# Patient Record
Sex: MALE | Race: WHITE | NOT HISPANIC OR LATINO | Employment: FULL TIME | URBAN - METROPOLITAN AREA
[De-identification: names, ages, dates, MRNs, and addresses within clinical notes are randomized per-mention and may not be internally consistent; named-entity substitution may affect disease eponyms.]

---

## 2018-05-12 ENCOUNTER — OFFICE VISIT (OUTPATIENT)
Dept: URGENT CARE | Facility: CLINIC | Age: 53
End: 2018-05-12
Payer: COMMERCIAL

## 2018-05-12 VITALS
DIASTOLIC BLOOD PRESSURE: 85 MMHG | HEIGHT: 68 IN | RESPIRATION RATE: 16 BRPM | OXYGEN SATURATION: 98 % | TEMPERATURE: 98 F | SYSTOLIC BLOOD PRESSURE: 128 MMHG | BODY MASS INDEX: 23.79 KG/M2 | WEIGHT: 157 LBS | HEART RATE: 72 BPM

## 2018-05-12 DIAGNOSIS — A09 TRAVELER'S DIARRHEA: Primary | ICD-10-CM

## 2018-05-12 PROCEDURE — 3008F BODY MASS INDEX DOCD: CPT | Mod: CPTII,S$GLB,, | Performed by: NURSE PRACTITIONER

## 2018-05-12 PROCEDURE — 99203 OFFICE O/P NEW LOW 30 MIN: CPT | Mod: S$GLB,,, | Performed by: NURSE PRACTITIONER

## 2018-05-12 RX ORDER — SERTRALINE HYDROCHLORIDE 50 MG/1
TABLET, FILM COATED ORAL
COMMUNITY

## 2018-05-12 RX ORDER — LOSARTAN POTASSIUM 100 MG/1
TABLET ORAL
COMMUNITY

## 2018-05-12 RX ORDER — AMLODIPINE BESYLATE 10 MG/1
TABLET ORAL
COMMUNITY

## 2018-05-12 RX ORDER — ATORVASTATIN CALCIUM 40 MG/1
TABLET, FILM COATED ORAL
COMMUNITY

## 2018-05-12 RX ORDER — CIPROFLOXACIN 500 MG/1
500 TABLET ORAL 2 TIMES DAILY
Qty: 6 TABLET | Refills: 0 | Status: SHIPPED | OUTPATIENT
Start: 2018-05-12 | End: 2018-05-17

## 2018-05-12 NOTE — PATIENT INSTRUCTIONS
Traveler's Diarrhea (Adult)    Traveler's diarrhea is an infection in the intestinal tract caused by bacteria called E coli. This bacteria is commonly found in water supplies of developing countries. The local people of those countries are used to E coli in the water and don't get sick. Tourists who drink contaminated water or eat foods that were washed or prepared with this water may become very ill.  The illness begins 1 to 3 days after exposure and lasts up to 5 days. Symptoms include fever, vomiting, stomach cramping and watery diarrhea. There may also be blood or mucus in the stool. Mild cases will get better without treatment. Antibiotics are used for more severe cases.  Home care  · If you were prescribed antibiotics,  take them until they are finished.  · You may use acetaminophen or ibuprofen to control fever, unless another medicine was prescribed. If you have chronic liver or kidney disease or have ever had a stomach ulcer or gastrointestinal  bleeding, talk with the doctor before using these medicines. Aspirin should never be used in anyone under 18 years of age who is ill with a fever. It may cause severe illness or death.  · Do not take over-the-counter anti-diarrheal medicines, unless advised by the doctor.  Once vomiting stops, follow these guidelines:  During the first 12 to 24 hours, follow the diet below:  · Fruit juices. Apple, grape and cranberry juice; clear fruit drinks, electrolyte replacement and sports drinks.  · Beverages. Soft drinks without caffeine; mineral water (plain or flavored); decaffeinated tea and coffee  · Soups. Clear broth, consommé and bouillon.  · Desserts. Plain gelatin, popsicles and fruit juice bars; As you feel better, you may add 6 to 8 oz of yogurt per day.  During the next 24 hours, you may add the following to the above:  · Hot cereal, plain toast, bread, rolls, crackers  · Plain noodles, rice, mashed potatoes, chicken noodle or rice soup  · Unsweetened canned  fruit (avoid pineapple), bananas  · Limit fat intake to less than 15 grams per day by avoiding margarine, butter, oils, mayonnaise, sauces, gravies, fried foods, peanut butter, meat, poultry and fish.  · Limit fiber; avoid raw or cooked vegetables, fresh fruits (except bananas) and bran cereals.  · Limit caffeine and chocolate. No spices or seasonings except salt.  During the next 24 hours you can gradually resume a normal diet as the symptoms lessen.  Follow-up care  Follow up with your healthcare provider, or as advised. Call if you are not improving within 24 hours or if the diarrhea lasts more than 1 week on antibiotics. If a stool (diarrhea) sample was taken, you may call in 2 days (or as directed) for the results.  When to seek medical advice  Call your healthcare provider if any of these occur:  · Severe constant pain in the lower part of the abdomen, on the right side  · Blood in diarrhea or vomit, dark coffee ground appearing vomit, or dark tarry stools  · continued vomiting (unable to keep liquids down)  · Frequent diarrhea (more than 5 times a day); blood (red or black) or mucus in diarrhea  · Reduced oral intake  · Reduced urine output or extreme thirst  · Weakness, dizziness, fainting  · Drowsiness, confusion, stiff neck, or seizure  · Fever (1 degree above your normal temperature) lasting 24 to 48 hours, or whatever your healthcare provider told you to report based on your condition  Date Last Reviewed: 11/16/2015  © 3262-1154 [employer]. [address] [place] [address], [place] [address]. All rights reserved. This information is not intended as a substitute for professional medical care. Always follow your healthcare professional's instructions.

## 2018-05-12 NOTE — PROGRESS NOTES
"Subjective:       Patient ID: Ramana Santos is a 52 y.o. male.    Vitals:  height is 5' 8" (1.727 m) and weight is 71.2 kg (157 lb). His temperature is 98.4 °F (36.9 °C). His blood pressure is 128/85 and his pulse is 72. His respiration is 16 and oxygen saturation is 98%.     Chief Complaint: Diarrhea    Pt presents today with chronic diarrhea. Pt states it started three weeks ago when he was in [place] after something he ate. Pt states he was given some ABX there which made it go away for a couple of days but then it came back and it has been intermittent ever since. Pt denies any chills, fever, or fatigue. Pt state he also has slight abdominal pain when he has diarrhea. Pt is from [place] and is going on a cruise tomorrow.     Diarrhea occurs 4 times per day. It is associated with eating (occurs soon after).  No blood or mucus in stool. No fever or nausea.      Diarrhea    This is a new problem. The current episode started 1 to 4 weeks ago. The patient states that diarrhea does not awaken him from sleep. Associated symptoms include abdominal pain. Pertinent negatives include no arthralgias, chills, fever, headaches, increased  flatus or vomiting. Nothing aggravates the symptoms.     Review of Systems   Constitution: Negative for chills and fever.   Cardiovascular: Negative for chest pain.   Respiratory: Negative for shortness of breath.    Musculoskeletal: Negative for arthralgias and back pain.   Gastrointestinal: Positive for abdominal pain and diarrhea. Negative for constipation, flatus, hematochezia, melena, nausea and vomiting.   Genitourinary: Negative for dysuria.   Neurological: Negative for headaches.       Objective:      Physical Exam   Constitutional: He is oriented to person, place, and time. He appears well-developed and well-nourished.   HENT:   Head: Normocephalic and atraumatic.   Right Ear: External ear normal.   Left Ear: External ear normal.   Nose: Nose normal.   Mouth/Throat: Mucous membranes " are normal.   Eyes: Conjunctivae and lids are normal.   Neck: Trachea normal and full passive range of motion without pain. Neck supple.   Cardiovascular: Normal rate, regular rhythm and normal heart sounds.    Pulmonary/Chest: Effort normal and breath sounds normal. No respiratory distress.   Abdominal: Soft. Normal appearance and bowel sounds are normal. He exhibits no distension, no abdominal bruit, no pulsatile midline mass and no mass. There is no tenderness.   Musculoskeletal: Normal range of motion. He exhibits no edema.   Neurological: He is alert and oriented to person, place, and time. He has normal strength.   Skin: Skin is warm, dry and intact. He is not diaphoretic. No pallor.   Psychiatric: He has a normal mood and affect. His speech is normal and behavior is normal. Judgment and thought content normal. Cognition and memory are normal.   Nursing note and vitals reviewed.      Assessment:       1. Traveler's diarrhea        Plan:         Traveler's diarrhea  -     ciprofloxacin HCl (CIPRO) 500 MG tablet; Take 1 tablet (500 mg total) by mouth 2 (two) times daily.  Dispense: 6 tablet; Refill: 0      Patient Instructions     Traveler's Diarrhea (Adult)    Traveler's diarrhea is an infection in the intestinal tract caused by bacteria called E coli. This bacteria is commonly found in water supplies of developing countries. The local people of those countries are used to E coli in the water and don't get sick. Tourists who drink contaminated water or eat foods that were washed or prepared with this water may become very ill.  The illness begins 1 to 3 days after exposure and lasts up to 5 days. Symptoms include fever, vomiting, stomach cramping and watery diarrhea. There may also be blood or mucus in the stool. Mild cases will get better without treatment. Antibiotics are used for more severe cases.  Home care  · If you were prescribed antibiotics,  take them until they are finished.  · You may use  acetaminophen or ibuprofen to control fever, unless another medicine was prescribed. If you have chronic liver or kidney disease or have ever had a stomach ulcer or gastrointestinal  bleeding, talk with the doctor before using these medicines. Aspirin should never be used in anyone under 18 years of age who is ill with a fever. It may cause severe illness or death.  · Do not take over-the-counter anti-diarrheal medicines, unless advised by the doctor.  Once vomiting stops, follow these guidelines:  During the first 12 to 24 hours, follow the diet below:  · Fruit juices. Apple, grape and cranberry juice; clear fruit drinks, electrolyte replacement and sports drinks.  · Beverages. Soft drinks without caffeine; mineral water (plain or flavored); decaffeinated tea and coffee  · Soups. Clear broth, consommé and bouillon.  · Desserts. Plain gelatin, popsicles and fruit juice bars; As you feel better, you may add 6 to 8 oz of yogurt per day.  During the next 24 hours, you may add the following to the above:  · Hot cereal, plain toast, bread, rolls, crackers  · Plain noodles, rice, mashed potatoes, chicken noodle or rice soup  · Unsweetened canned fruit (avoid pineapple), bananas  · Limit fat intake to less than 15 grams per day by avoiding margarine, butter, oils, mayonnaise, sauces, gravies, fried foods, peanut butter, meat, poultry and fish.  · Limit fiber; avoid raw or cooked vegetables, fresh fruits (except bananas) and bran cereals.  · Limit caffeine and chocolate. No spices or seasonings except salt.  During the next 24 hours you can gradually resume a normal diet as the symptoms lessen.  Follow-up care  Follow up with your healthcare provider, or as advised. Call if you are not improving within 24 hours or if the diarrhea lasts more than 1 week on antibiotics. If a stool (diarrhea) sample was taken, you may call in 2 days (or as directed) for the results.  When to seek medical advice  Call your healthcare provider  if any of these occur:  · Severe constant pain in the lower part of the abdomen, on the right side  · Blood in diarrhea or vomit, dark coffee ground appearing vomit, or dark tarry stools  · continued vomiting (unable to keep liquids down)  · Frequent diarrhea (more than 5 times a day); blood (red or black) or mucus in diarrhea  · Reduced oral intake  · Reduced urine output or extreme thirst  · Weakness, dizziness, fainting  · Drowsiness, confusion, stiff neck, or seizure  · Fever (1 degree above your normal temperature) lasting 24 to 48 hours, or whatever your healthcare provider told you to report based on your condition  Date Last Reviewed: 11/16/2015  © 3812-3869 [employer]. [address] [place] [address], [place] [address]. All rights reserved. This information is not intended as a substitute for professional medical care. Always follow your healthcare professional's instructions.